# Patient Record
Sex: FEMALE | Race: WHITE | URBAN - METROPOLITAN AREA
[De-identification: names, ages, dates, MRNs, and addresses within clinical notes are randomized per-mention and may not be internally consistent; named-entity substitution may affect disease eponyms.]

---

## 2020-07-14 ENCOUNTER — APPOINTMENT (RX ONLY)
Dept: URBAN - METROPOLITAN AREA CLINIC 110 | Facility: CLINIC | Age: 60
Setting detail: DERMATOLOGY
End: 2020-07-14

## 2020-07-14 DIAGNOSIS — L98.8 OTHER SPECIFIED DISORDERS OF THE SKIN AND SUBCUTANEOUS TISSUE: ICD-10-CM

## 2020-07-14 DIAGNOSIS — Z85.828 PERSONAL HISTORY OF OTHER MALIGNANT NEOPLASM OF SKIN: ICD-10-CM

## 2020-07-14 DIAGNOSIS — L70.8 OTHER ACNE: ICD-10-CM

## 2020-07-14 PROCEDURE — 99202 OFFICE O/P NEW SF 15 MIN: CPT

## 2020-07-14 PROCEDURE — ? COUNSELING

## 2020-07-14 PROCEDURE — ? PRESCRIPTION

## 2020-07-14 RX ORDER — TRETINOIN 1 MG/G
CREAM TOPICAL
Qty: 1 | Refills: 5 | Status: ERX | COMMUNITY
Start: 2020-07-14

## 2020-07-14 RX ADMIN — TRETINOIN: 1 CREAM TOPICAL at 00:00

## 2020-07-14 ASSESSMENT — LOCATION DETAILED DESCRIPTION DERM
LOCATION DETAILED: RIGHT INFERIOR MEDIAL MALAR CHEEK
LOCATION DETAILED: LEFT INFERIOR CENTRAL MALAR CHEEK
LOCATION DETAILED: LEFT PROXIMAL PRETIBIAL REGION
LOCATION DETAILED: SUPERIOR MID FOREHEAD

## 2020-07-14 ASSESSMENT — LOCATION SIMPLE DESCRIPTION DERM
LOCATION SIMPLE: SUPERIOR FOREHEAD
LOCATION SIMPLE: LEFT PRETIBIAL REGION
LOCATION SIMPLE: RIGHT CHEEK
LOCATION SIMPLE: LEFT CHEEK

## 2020-07-14 ASSESSMENT — LOCATION ZONE DERM
LOCATION ZONE: LEG
LOCATION ZONE: FACE

## 2020-07-14 NOTE — PROCEDURE: MIPS QUALITY
Quality 402: Tobacco Use And Help With Quitting Among Adolescents: Patient screened for tobacco and is an ex-smoker
Quality 431: Preventive Care And Screening: Unhealthy Alcohol Use - Screening: Patient screened for unhealthy alcohol use using a single question and scores less than 2 times per year
Quality 47: Advance Care Plan: Advance Care Planning discussed and documented; advance care plan or surrogate decision maker documented in the medical record.
Quality 110: Preventive Care And Screening: Influenza Immunization: Influenza Immunization not Administered because Patient Refused.
Quality 226: Preventive Care And Screening: Tobacco Use: Screening And Cessation Intervention: Tobacco Screening not Performed for Medical Reasons
Detail Level: Detailed

## 2020-07-14 NOTE — HPI: EVALUATION OF SKIN LESION(S)
Hpi Title: Evaluation of Skin Lesions
How Severe Are Your Spot(S)?: moderate
Have Your Spot(S) Been Treated In The Past?: has not been treated
Additional History: History of bcc on left lower leg years ago

## 2021-04-30 ENCOUNTER — APPOINTMENT (RX ONLY)
Dept: URBAN - METROPOLITAN AREA CLINIC 116 | Facility: CLINIC | Age: 61
Setting detail: DERMATOLOGY
End: 2021-04-30

## 2021-04-30 DIAGNOSIS — Z41.9 ENCOUNTER FOR PROCEDURE FOR PURPOSES OTHER THAN REMEDYING HEALTH STATE, UNSPECIFIED: ICD-10-CM

## 2021-04-30 PROCEDURE — ? COSMETIC CONSULTATION: MAKEUP

## 2021-04-30 ASSESSMENT — LOCATION ZONE DERM: LOCATION ZONE: FACE

## 2021-04-30 ASSESSMENT — LOCATION DETAILED DESCRIPTION DERM
LOCATION DETAILED: RIGHT CENTRAL EYEBROW
LOCATION DETAILED: LEFT CENTRAL EYEBROW

## 2021-04-30 ASSESSMENT — LOCATION SIMPLE DESCRIPTION DERM
LOCATION SIMPLE: LEFT EYEBROW
LOCATION SIMPLE: RIGHT EYEBROW

## 2021-05-26 ENCOUNTER — APPOINTMENT (RX ONLY)
Dept: URBAN - METROPOLITAN AREA CLINIC 116 | Facility: CLINIC | Age: 61
Setting detail: DERMATOLOGY
End: 2021-05-26

## 2021-05-26 DIAGNOSIS — Z41.9 ENCOUNTER FOR PROCEDURE FOR PURPOSES OTHER THAN REMEDYING HEALTH STATE, UNSPECIFIED: ICD-10-CM

## 2021-05-26 PROCEDURE — ? MICROBLADING

## 2021-05-26 ASSESSMENT — LOCATION SIMPLE DESCRIPTION DERM
LOCATION SIMPLE: RIGHT EYEBROW
LOCATION SIMPLE: LEFT EYEBROW

## 2021-05-26 ASSESSMENT — LOCATION DETAILED DESCRIPTION DERM
LOCATION DETAILED: RIGHT LATERAL EYEBROW
LOCATION DETAILED: LEFT CENTRAL EYEBROW

## 2021-05-26 ASSESSMENT — LOCATION ZONE DERM: LOCATION ZONE: FACE

## 2021-05-26 NOTE — PROCEDURE: MICROBLADING
Intro And Consent: Although Microblading is effective in most cases, no guarantee can be made that a specific client will benefit from the procedure. This is a process of inserting pigment into the epidermis of the skin and is a form of semi-permanent tattooing. All instruments which enter the skin or come in contact with body fluids are disposable and disposed of after the visit. Cross contamination guidelines are strictly adhered to. Generally, the results are excellent. However, a perfect result is not a realistic expectation during the first appointment. Patient is aware to expect a touch up after healing is completed. Initially the color will appear much more vibrant or darker compared to the end result. Usually within 7 days the color will fade 40-50 percent, soften and look more natural. The pigment is semi-permanent and will fade over time and will likely need to be touched up once or twice a year. The procedure was thoroughly explained and the patient expressed their verbal understanding.
Detail Level: Zone
Procedure Text: Brows were cleansed with an alcohol swab. The brow hair was shaped with tweezers prior to proceeding. The area was prepped with alcohol for the topical anesthesia. Topical anesthesia was applied and left on for 15 minutes. Color was applied with a disposable hand tool to one brow and then the other. The areas were wiped with cotton glaze soaked with sterile water and then process was repeated. The average number of passes is 3-4. Vanicream ointment was applied after the procedure was completed. Detailed post-care and follow-up instructions were discussed and a written copy was given to patient.  \\n\\nPigment used: Zilphia Meigs & Salma 1:3

## 2021-07-07 ENCOUNTER — APPOINTMENT (RX ONLY)
Dept: URBAN - METROPOLITAN AREA CLINIC 116 | Facility: CLINIC | Age: 61
Setting detail: DERMATOLOGY
End: 2021-07-07

## 2021-07-07 DIAGNOSIS — Z41.9 ENCOUNTER FOR PROCEDURE FOR PURPOSES OTHER THAN REMEDYING HEALTH STATE, UNSPECIFIED: ICD-10-CM

## 2021-07-07 PROCEDURE — ? MICROBLADING

## 2021-07-07 ASSESSMENT — LOCATION ZONE DERM: LOCATION ZONE: FACE

## 2021-07-07 ASSESSMENT — LOCATION DETAILED DESCRIPTION DERM
LOCATION DETAILED: RIGHT CENTRAL EYEBROW
LOCATION DETAILED: LEFT CENTRAL EYEBROW

## 2021-07-07 ASSESSMENT — LOCATION SIMPLE DESCRIPTION DERM
LOCATION SIMPLE: RIGHT EYEBROW
LOCATION SIMPLE: LEFT EYEBROW

## 2021-07-07 NOTE — PROCEDURE: MICROBLADING
Procedure Text: Brows were cleansed with an alcohol swab. The brow hair was shaped with tweezers prior to proceeding. The area was prepped with alcohol for the topical anesthesia. Topical anesthesia was applied and left on for 15 minutes. Color was applied with a disposable hand tool to one brow and then the other. The areas were wiped with cotton glaze soaked with sterile water and then process was repeated. The average number of passes is 3-4. Vanicream ointment was applied after the procedure was completed. Detailed post-care and follow-up instructions were discussed and a written copy was given to patient.  \\n\\nPigment used: Erin Certain 2:2:1\\n6 wk t/u
Detail Level: Zone
Intro And Consent: Although Microblading is effective in most cases, no guarantee can be made that a specific client will benefit from the procedure. This is a process of inserting pigment into the epidermis of the skin and is a form of semi-permanent tattooing. All instruments which enter the skin or come in contact with body fluids are disposable and disposed of after the visit. Cross contamination guidelines are strictly adhered to. Generally, the results are excellent. However, a perfect result is not a realistic expectation during the first appointment. Patient is aware to expect a touch up after healing is completed. Initially the color will appear much more vibrant or darker compared to the end result. Usually within 7 days the color will fade 40-50 percent, soften and look more natural. The pigment is semi-permanent and will fade over time and will likely need to be touched up once or twice a year. The procedure was thoroughly explained and the patient expressed their verbal understanding.

## 2024-03-19 ENCOUNTER — LAB (OUTPATIENT)
Dept: URBAN - METROPOLITAN AREA SURGERY CENTER 4 | Facility: SURGERY CENTER | Age: 64
End: 2024-03-19

## 2024-03-20 ENCOUNTER — DAC (OUTPATIENT)
Dept: URBAN - METROPOLITAN AREA SURGERY CENTER 4 | Facility: SURGERY CENTER | Age: 64
End: 2024-03-20
Payer: COMMERCIAL

## 2024-03-20 ENCOUNTER — LAB (OUTPATIENT)
Dept: URBAN - METROPOLITAN AREA CLINIC 4 | Facility: CLINIC | Age: 64
End: 2024-03-20
Payer: COMMERCIAL

## 2024-03-20 DIAGNOSIS — Z12.11 ENCOUNTER FOR SCREENING FOR MALIGNANT NEOPLASM OF COLON: ICD-10-CM

## 2024-03-20 DIAGNOSIS — K64.1 SECOND DEGREE HEMORRHOIDS: ICD-10-CM

## 2024-03-20 DIAGNOSIS — K63.5 POLYP OF DESCENDING COLON, UNSPECIFIED TYPE: ICD-10-CM

## 2024-03-20 DIAGNOSIS — K57.30 DIVERTICULOSIS OF LARGE INTESTINE WITHOUT PERFORATION OR ABSCESS WITHOUT BLEEDING: ICD-10-CM

## 2024-03-20 DIAGNOSIS — K63.89 OTHER SPECIFIED DISEASES OF INTESTINE: ICD-10-CM

## 2024-03-20 PROCEDURE — 88305 TISSUE EXAM BY PATHOLOGIST: CPT | Performed by: PATHOLOGY

## 2024-03-20 PROCEDURE — 45380 COLONOSCOPY AND BIOPSY: CPT | Performed by: INTERNAL MEDICINE

## 2024-03-20 PROCEDURE — 45385 COLONOSCOPY W/LESION REMOVAL: CPT | Performed by: INTERNAL MEDICINE
